# Patient Record
Sex: FEMALE | Race: WHITE | HISPANIC OR LATINO
[De-identification: names, ages, dates, MRNs, and addresses within clinical notes are randomized per-mention and may not be internally consistent; named-entity substitution may affect disease eponyms.]

---

## 2022-02-24 PROBLEM — Z00.00 ENCOUNTER FOR PREVENTIVE HEALTH EXAMINATION: Status: ACTIVE | Noted: 2022-02-24

## 2022-02-25 ENCOUNTER — APPOINTMENT (OUTPATIENT)
Dept: PEDIATRIC ORTHOPEDIC SURGERY | Facility: CLINIC | Age: 62
End: 2022-02-25
Payer: MEDICAID

## 2022-02-25 VITALS — WEIGHT: 141 LBS | TEMPERATURE: 97.2 F | HEIGHT: 62 IN | BODY MASS INDEX: 25.95 KG/M2

## 2022-02-25 DIAGNOSIS — Z87.39 PERSONAL HISTORY OF OTHER DISEASES OF THE MUSCULOSKELETAL SYSTEM AND CONNECTIVE TISSUE: ICD-10-CM

## 2022-02-25 DIAGNOSIS — Z83.3 FAMILY HISTORY OF DIABETES MELLITUS: ICD-10-CM

## 2022-02-25 DIAGNOSIS — Z80.9 FAMILY HISTORY OF MALIGNANT NEOPLASM, UNSPECIFIED: ICD-10-CM

## 2022-02-25 DIAGNOSIS — M25.50 PAIN IN UNSPECIFIED JOINT: ICD-10-CM

## 2022-02-25 DIAGNOSIS — Z87.898 PERSONAL HISTORY OF OTHER SPECIFIED CONDITIONS: ICD-10-CM

## 2022-02-25 DIAGNOSIS — M54.16 RADICULOPATHY, LUMBAR REGION: ICD-10-CM

## 2022-02-25 PROCEDURE — 99202 OFFICE O/P NEW SF 15 MIN: CPT

## 2022-02-25 RX ORDER — NAPROXEN 250 MG/1
250 TABLET ORAL
Refills: 0 | Status: ACTIVE | COMMUNITY

## 2022-02-25 RX ORDER — TIZANIDINE 4 MG/1
4 TABLET ORAL 3 TIMES DAILY
Qty: 30 | Refills: 1 | Status: ACTIVE | COMMUNITY
Start: 2022-02-25 | End: 1900-01-01

## 2022-02-25 NOTE — ASSESSMENT
[FreeTextEntry1] : Impression: Lumbar radiculitis.\par \par I have encouraged this patient to return to physical therapy she has been placed on tizanidine.  I have also advised weight reduction along with attention to improving strength of her core.  Return as necessary

## 2022-02-25 NOTE — PHYSICAL EXAM
[de-identified] : Her exam today she is overweight.  She has a straight spine level pelvis no leg length discrepancy and a normal gait without limp.  She does have good motion to the lumbar spine though there is mild discomfort at the limits of motion in all planes.  Mild spasm is noted on both sides of the midline extending into the posterior pelvic wall though no trigger points are present.  Both lower extremities are symmetric in appearance without atrophy and move well at all individual joints.  There are no tension signs present and she is neurologically intact.\par \par Her MRI recently performed has been reviewed multiple disc bulges with mild facet arthritis and no stenosis noted

## 2022-02-25 NOTE — HISTORY OF PRESENT ILLNESS
[de-identified] : This 61-year-old is seen today for second opinion with regards to her back.  She has had longstanding back pain with no obvious history of trauma precipitating event.  Recently she has had some radiation into the left lower extremity.  No numbness paresthesias motor weakness bladder or bowel dysfunction.  She was seen by another physician who ordered physical therapy.  She had 3 weeks of physical therapy stopped doing it stating it did not help.  She has also been placed on Naprosyn.  She saw her internist yesterday who has prescribed a new medicine though she is not sure as to which one.  She did have an MRI of the lumbar spine February 10 of this year written results are on the chart and have been reviewed as well as the films themselves.  Past medical history is benign.